# Patient Record
Sex: FEMALE | Race: WHITE | URBAN - METROPOLITAN AREA
[De-identification: names, ages, dates, MRNs, and addresses within clinical notes are randomized per-mention and may not be internally consistent; named-entity substitution may affect disease eponyms.]

---

## 2023-05-31 ENCOUNTER — OFFICE VISIT (OUTPATIENT)
Facility: LOCATION | Age: 43
End: 2023-05-31
Payer: COMMERCIAL

## 2023-05-31 DIAGNOSIS — H40.033 ANATOMICAL NARROW ANGLE, BILATERAL: Primary | ICD-10-CM

## 2023-05-31 DIAGNOSIS — R51.9 HEADACHE, UNSPECIFIED: ICD-10-CM

## 2023-05-31 DIAGNOSIS — D31.32 BENIGN NEOPLASM OF LEFT CHOROID: ICD-10-CM

## 2023-05-31 PROCEDURE — 92133 CPTRZD OPH DX IMG PST SGM ON: CPT

## 2023-05-31 PROCEDURE — 92004 COMPRE OPH EXAM NEW PT 1/>: CPT

## 2023-05-31 PROCEDURE — 92020 GONIOSCOPY: CPT

## 2023-05-31 ASSESSMENT — INTRAOCULAR PRESSURE
OD: 19
OS: 19

## 2023-05-31 NOTE — IMPRESSION/PLAN
Impression: Anatomical narrow angle, bilateral: H40.033. Plan: Pt educated on condition. Pt safe to dilate. IOP in normal range with no evidence of glaucomatous damage. Pt educated on s/s of angle closure, pt to notify clinic immediately if any of these occur. Monitor RNFL OCT showed no thinning Gonio showed open  No evidence of past closure

## 2023-05-31 NOTE — IMPRESSION/PLAN
Impression: Headache, unspecified: R51.9. Plan: No retinal pathology seen today. RNFL OCT ordered today was normal. Recommend Seeing PCP or neurologist if HA persist. Pt expressed understanding. Monitor.

## 2023-05-31 NOTE — IMPRESSION/PLAN
Impression: Benign neoplasm of left choroid: D31.32. Plan: Pt educated on condition. Nevus is currently non suspicious will monitor annually with DFE and optos.